# Patient Record
(demographics unavailable — no encounter records)

---

## 2025-03-13 NOTE — HISTORY OF PRESENT ILLNESS
[FreeTextEntry1] : Here to establish primary care and for annual CPE [de-identified] : #Acute URI w/ laryngitis Wed aft developed sore throat "swallowing knives" Then felt "head and nasal congestion" and took Tylenol cold and flu Fri felt ok, lethargic Sat developed dry cough, then Sun cough continued and lost her voice Mon similar symptoms, developed nasal and ear congestion   Tue saw that her eyes started getting involved w/ pink conjunctiva L>R as well as itchy and runny eyes   #Depression w/ anxiety Lexapro 10mg Has a therapist Sleeps well  #Gyn/Sexual Hx Menses: LMP 3/2, cycles are regular, no sig dysmenorrhea Sexually active: Yes -  Contraception: No History of STIs: No  #HCM - Pap smear: Going next week, has had colposcopies before   - Mammogram + US (dense breast): Going next week  - Vaccines: Tdap - unsure but likely 2018 / COVID/Flu - annual boosters UTD

## 2025-03-13 NOTE — PHYSICAL EXAM
[No Acute Distress] : no acute distress [Well Nourished] : well nourished [Well Developed] : well developed [Well-Appearing] : well-appearing [Conjunctival Hyperemia Bilateral] : hyperemia [Conjunctival Watery Discharge Both Eyes] : a watery discharge [Conjunctival Purulent Discharge Both Eyes] : no purulent discharge [Subconjunctival Hemorrhage Both Eyes] : no subconjunctival hemorrhages [Epiphora Both Eyes] : no increase in tearing [Normal] : the eyelids were normal bilaterally [EOM Intact] : extraocular movements were intact [Normal Outer Ear/Nose] : the outer ears and nose were normal in appearance [Normal TMs] : both tympanic membranes were normal [No Lymphadenopathy] : no lymphadenopathy [Supple] : supple [Thyroid Normal, No Nodules] : the thyroid was normal and there were no nodules present [No Respiratory Distress] : no respiratory distress  [No Accessory Muscle Use] : no accessory muscle use [Clear to Auscultation] : lungs were clear to auscultation bilaterally [Normal Rate] : normal rate  [Regular Rhythm] : with a regular rhythm [Normal S1, S2] : normal S1 and S2 [No Edema] : there was no peripheral edema [Non-distended] : non-distended [No Joint Swelling] : no joint swelling [No Rash] : no rash [Normal Gait] : normal gait [Alert and Oriented x3] : oriented to person, place, and time [Normal Insight/Judgement] : insight and judgment were intact [de-identified] : Hoarse voice, appears mildly sick as expected  [de-identified] : Oropharyngeal erythema noted w/o exudates

## 2025-03-13 NOTE — HEALTH RISK ASSESSMENT
[Yes] : Yes [2 - 4 times a month (2 pts)] : 2-4 times a month (2 points) [1 or 2 (0 pts)] : 1 or 2 (0 points) [Never (0 pts)] : Never (0 points) [No] : In the past 12 months have you used drugs other than those required for medical reasons? No [Little interest or pleasure doing things] : 1) Little interest or pleasure doing things [Feeling down, depressed, or hopeless] : 2) Feeling down, depressed, or hopeless [PHQ-2 Negative - No further assessment needed] : PHQ-2 Negative - No further assessment needed [Never] : Never [0] : 2) Feeling down, depressed, or hopeless: Not at all (0) [Audit-CScore] : 2 [de-identified] : Works out 3-4x a week: pilates, cardio, strength training [de-identified] : Cooks a lot, follows a healthy dietary pattern, cut out most dairy (possible lactose intolerance), minimizes meat   [[id number]] : 0

## 2025-03-13 NOTE — ASSESSMENT
[FreeTextEntry1] : #Acute URI w/ laryngitis - Will offer symptomatic tx as ordered below - Letter of sickness provided  #Depression w/ anxiety - Continue Lexapro 10mg as per psychiatrist - Has a therapist  #HCM - Pap smear: Going next week, has had colposcopies before   - Mammogram + US (dense breast): Going next week  - Vaccines: Tdap - unsure but likely 2018 / COVID/Flu - annual boosters UTD >>> can get vaccines here on Tue/Thu if interested, counseling provided - Will return for fasting labs once feeling better  - BP wnl

## 2025-03-13 NOTE — REVIEW OF SYSTEMS
[Fever] : no fever [Chills] : no chills [Fatigue] : fatigue [Night Sweats] : no night sweats [Recent Change In Weight] : ~T no recent weight change [Discharge] : discharge [Pain] : no pain [Redness] : redness [Dryness] : no dryness  [Vision Problems] : no vision problems [Itching] : itching [Earache] : no earache [Hearing Loss] : no hearing loss [Nosebleed] : no nosebleeds [Hoarseness] : hoarseness [Nasal Discharge] : no nasal discharge [Sore Throat] : no sore throat [Postnasal Drip] : no postnasal drip [Chest Pain] : no chest pain [Palpitations] : no palpitations [Leg Claudication] : no leg claudication [Lower Ext Edema] : no lower extremity edema [Orthopnea] : no orthopnea [Paroxysmal Nocturnal Dyspnea] : no paroxysmal nocturnal dyspnea [Shortness Of Breath] : no shortness of breath [Wheezing] : no wheezing [Cough] : no cough [Dyspnea on Exertion] : no dyspnea on exertion [Abdominal Pain] : no abdominal pain [Nausea] : no nausea [Constipation] : no constipation [Diarrhea] : diarrhea [Vomiting] : no vomiting [Heartburn] : no heartburn [Melena] : no melena [Dysuria] : no dysuria [Hematuria] : no hematuria [Joint Pain] : no joint pain [Muscle Pain] : no muscle pain [Itching] : no itching [Skin Rash] : no skin rash [Headache] : no headache [Suicidal] : not suicidal [Insomnia] : no insomnia [Easy Bleeding] : no easy bleeding [Easy Bruising] : no easy bruising [Swollen Glands] : no swollen glands

## 2025-03-13 NOTE — HEALTH RISK ASSESSMENT
[Yes] : Yes [2 - 4 times a month (2 pts)] : 2-4 times a month (2 points) [1 or 2 (0 pts)] : 1 or 2 (0 points) [Never (0 pts)] : Never (0 points) [No] : In the past 12 months have you used drugs other than those required for medical reasons? No [Little interest or pleasure doing things] : 1) Little interest or pleasure doing things [Feeling down, depressed, or hopeless] : 2) Feeling down, depressed, or hopeless [PHQ-2 Negative - No further assessment needed] : PHQ-2 Negative - No further assessment needed [Never] : Never [0] : 2) Feeling down, depressed, or hopeless: Not at all (0) [Audit-CScore] : 2 [de-identified] : Works out 3-4x a week: pilates, cardio, strength training [de-identified] : Cooks a lot, follows a healthy dietary pattern, cut out most dairy (possible lactose intolerance), minimizes meat   [[id number]] : 0

## 2025-03-13 NOTE — PHYSICAL EXAM
[No Acute Distress] : no acute distress [Well Nourished] : well nourished [Well Developed] : well developed [Well-Appearing] : well-appearing [Conjunctival Hyperemia Bilateral] : hyperemia [Conjunctival Watery Discharge Both Eyes] : a watery discharge [Conjunctival Purulent Discharge Both Eyes] : no purulent discharge [Subconjunctival Hemorrhage Both Eyes] : no subconjunctival hemorrhages [Epiphora Both Eyes] : no increase in tearing [Normal] : the eyelids were normal bilaterally [EOM Intact] : extraocular movements were intact [Normal Outer Ear/Nose] : the outer ears and nose were normal in appearance [Normal TMs] : both tympanic membranes were normal [No Lymphadenopathy] : no lymphadenopathy [Supple] : supple [Thyroid Normal, No Nodules] : the thyroid was normal and there were no nodules present [No Respiratory Distress] : no respiratory distress  [No Accessory Muscle Use] : no accessory muscle use [Clear to Auscultation] : lungs were clear to auscultation bilaterally [Normal Rate] : normal rate  [Regular Rhythm] : with a regular rhythm [Normal S1, S2] : normal S1 and S2 [No Edema] : there was no peripheral edema [Non-distended] : non-distended [No Joint Swelling] : no joint swelling [No Rash] : no rash [Normal Gait] : normal gait [Alert and Oriented x3] : oriented to person, place, and time [Normal Insight/Judgement] : insight and judgment were intact [de-identified] : Hoarse voice, appears mildly sick as expected  [de-identified] : Oropharyngeal erythema noted w/o exudates

## 2025-03-13 NOTE — HISTORY OF PRESENT ILLNESS
[FreeTextEntry1] : Here to establish primary care and for annual CPE [de-identified] : #Acute URI w/ laryngitis Wed aft developed sore throat "swallowing knives" Then felt "head and nasal congestion" and took Tylenol cold and flu Fri felt ok, lethargic Sat developed dry cough, then Sun cough continued and lost her voice Mon similar symptoms, developed nasal and ear congestion   Tue saw that her eyes started getting involved w/ pink conjunctiva L>R as well as itchy and runny eyes   #Depression w/ anxiety Lexapro 10mg Has a therapist Sleeps well  #Gyn/Sexual Hx Menses: LMP 3/2, cycles are regular, no sig dysmenorrhea Sexually active: Yes -  Contraception: No History of STIs: No  #HCM - Pap smear: Going next week, has had colposcopies before   - Mammogram + US (dense breast): Going next week  - Vaccines: Tdap - unsure but likely 2018 / COVID/Flu - annual boosters UTD